# Patient Record
Sex: FEMALE | Race: WHITE | Employment: PART TIME | URBAN - METROPOLITAN AREA
[De-identification: names, ages, dates, MRNs, and addresses within clinical notes are randomized per-mention and may not be internally consistent; named-entity substitution may affect disease eponyms.]

---
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
## 2020-11-04 ENCOUNTER — HOSPITAL ENCOUNTER (OUTPATIENT)
Age: 20
Discharge: HOME | End: 2020-11-04

## 2020-11-04 LAB
A/G RATIO: 1.3 (ref 1.1–2.2)
ALBUMIN SERPL-MCNC: 4.3 G/DL (ref 3.4–5)
ALP BLD-CCNC: 92 U/L (ref 40–129)
ALT SERPL-CCNC: 12 U/L (ref 10–40)
ANION GAP SERPL CALCULATED.3IONS-SCNC: 14 MMOL/L (ref 3–16)
AST SERPL-CCNC: 17 U/L (ref 15–37)
BILIRUB SERPL-MCNC: 0.8 MG/DL (ref 0–1)
BUN BLDV-MCNC: 10 MG/DL (ref 7–20)
CALCIUM SERPL-MCNC: 9.1 MG/DL (ref 8.3–10.6)
CHLORIDE BLD-SCNC: 98 MMOL/L (ref 99–110)
CHOLESTEROL, TOTAL: 181 MG/DL (ref 0–199)
CO2: 24 MMOL/L (ref 21–32)
CREAT SERPL-MCNC: 0.7 MG/DL (ref 0.6–1.1)
GFR AFRICAN AMERICAN: >60
GFR NON-AFRICAN AMERICAN: >60
GLOBULIN: 3.3 G/DL
GLUCOSE BLD-MCNC: 73 MG/DL (ref 70–99)
HCT VFR BLD CALC: 40.3 % (ref 36–48)
HDLC SERPL-MCNC: 60 MG/DL (ref 40–60)
HEMOGLOBIN: 13.8 G/DL (ref 12–16)
LDL CHOLESTEROL CALCULATED: 109 MG/DL
MCH RBC QN AUTO: 27.6 PG (ref 26–34)
MCHC RBC AUTO-ENTMCNC: 34.1 G/DL (ref 31–36)
MCV RBC AUTO: 80.7 FL (ref 80–100)
PDW BLD-RTO: 13.6 % (ref 12.4–15.4)
PLATELET # BLD: 226 K/UL (ref 135–450)
PMV BLD AUTO: 8.1 FL (ref 5–10.5)
POTASSIUM SERPL-SCNC: 3.8 MMOL/L (ref 3.5–5.1)
PROLACTIN: 10.3 NG/ML
RBC # BLD: 4.99 M/UL (ref 4–5.2)
SODIUM BLD-SCNC: 136 MMOL/L (ref 136–145)
TOTAL PROTEIN: 7.6 G/DL (ref 6.4–8.2)
TRIGL SERPL-MCNC: 62 MG/DL (ref 0–150)
TSH SERPL DL<=0.05 MIU/L-ACNC: 0.91 UIU/ML (ref 0.27–4.2)
VLDLC SERPL CALC-MCNC: 12 MG/DL
WBC # BLD: 5.5 K/UL (ref 4–11)

## 2020-11-04 PROCEDURE — 85027 COMPLETE CBC AUTOMATED: CPT

## 2020-11-04 PROCEDURE — 80061 LIPID PANEL: CPT

## 2020-11-04 PROCEDURE — 36415 COLL VENOUS BLD VENIPUNCTURE: CPT

## 2020-11-04 PROCEDURE — 83835 ASSAY OF METANEPHRINES: CPT

## 2020-11-04 PROCEDURE — 80053 COMPREHEN METABOLIC PANEL: CPT

## 2020-11-04 PROCEDURE — 84146 ASSAY OF PROLACTIN: CPT

## 2020-11-04 PROCEDURE — 84443 ASSAY THYROID STIM HORMONE: CPT

## 2020-11-08 LAB
METANEPH/PLASMA INTERP: NORMAL
METANEPHRINE FREE PLASMA: 0.21 NMOL/L (ref 0–0.49)
NORMETANEPHRINE FREE PLASMA: 0.41 NMOL/L (ref 0–0.89)

## 2020-11-09 ENCOUNTER — HOSPITAL ENCOUNTER (OUTPATIENT)
Age: 20
Discharge: HOME | End: 2020-11-09

## 2020-11-09 PROCEDURE — 83835 ASSAY OF METANEPHRINES: CPT

## 2020-11-13 LAB
CREATININE 24 HOUR URINE: 1316 MG/D (ref 700–1600)
CREATININE URINE: 94 MG/DL
HOURS COLLECTED: 24
METANEPHRINE INTREP URINE: NORMAL
METANEPHRINE UG/G CRE: 55 UG/G CRT (ref 0–300)
METANEPHRINE, UR-PER VOL: 52 UG/L
METANEPHRINES URINE: 73 UG/D (ref 36–229)
NORMETANEPHRINE 24 HOUR URINE: 133 UG/D (ref 95–650)
NORMETANEPHRINE, (G/CRT): 101 UG/G CRT (ref 0–400)
NORMETANEPHRINES, NMOL/L: 95 UG/L
URINE TOTAL VOLUME: 1400

## 2022-05-02 ENCOUNTER — NURSE TRIAGE (OUTPATIENT)
Dept: OTHER | Facility: CLINIC | Age: 22
End: 2022-05-02

## 2022-05-02 NOTE — TELEPHONE ENCOUNTER
Limited triage due to adult not being present during call. Received call from Alicia Stanley at [facility] with Red Flag Complaint. Subjective: Caller states \"Dizziness\"     Current Symptoms: Dizzy episodes since Friday. States Pt is non-compliant with  BP meds. Onset: 4 days ago; intermittent    Associated Symptoms: NA    Pain Severity: Headaches    Temperature: denies fever    What has been tried: None    LMP: unsure Pregnant: No    Recommended disposition: See PCP within 3 Days    Care advice provided, patient verbalizes understanding; denies any other questions or concerns; instructed to call back for any new or worsening symptoms. Patient/Caller agrees with recommended disposition; writer provided warm transfer to [employer] at [facility] for appointment scheduling     Attention Provider: Thank you for allowing me to participate in the care of your patient. The patient was connected to triage in response to information provided to the ECC/PSC. Please do not respond through this encounter as the response is not directed to a shared pool.       Reason for Disposition   MILD dizziness (e.g., walking normally) AND has NOT been evaluated by physician for this (Exception: dizziness caused by heat exposure, sudden standing, or poor fluid intake)    Protocols used: DIZZINESS-ADULT-[place]

## 2024-11-27 PROBLEM — I10 UNCONTROLLED HYPERTENSION: Status: ACTIVE | Noted: 2024-11-27

## 2024-11-27 NOTE — PROGRESS NOTES
[facility]     CONSULTATION  [phone]  12/17/24  Referring: Colten Torrez DO (PCP)    REASON FOR CONSULT/CHIEF COMPLAINT/HPI     Reason for visit/ Chief complaint  Refractory Hypertension   HPI Blanca Castro is a 24 y.o. new female patient here by referral from Dr Cuevas for uncontrolled hypertension, diagnosed at age 14yo.     She has been worked up for this at a variety of locations including [facility], [facility], [facility].  She has had a multitude of tests and scans and no underlying etiology of secondary hypertension has been found.    Family history includes mother with HTN diagnosed in 20's and resolved after weight loss. Maternal grandfather cardiac arrest, ICD placement at 51yo. Paternal grandfather heart disease, Paternal grandmother TypeIDM.     COVID January 2022 August 22' she had an echo   September 22' she had a Cardiac MRI   January 23' she had a  Tilt Table test.   February 23' she wore a 24 hr BP monitor.     Today she is here with her mother. She works as a manager at [employer]. She is single, never been pregnant and not trying currently.  Denies smoking, drinking, or drug use. She drinks coffee daily.     She reports that she is not currently on any medications, \"its been a while\" (longer than a week). She stopped them due to feeling dizzy, nauseous, never helped lower her BP.     She denies snoring. Does have morning headaches. She had a sleep study about a month ago, has not gotten the results yet.     She has chest pain daily, tightness \"like something is sitting on her chest\". Denies currently. Her BP is high at home. She has headaches daily, blurry vision. Headache currently, BP is very high today, including on recheck by myself. Lowest BP at home is in 130's.     She was started on BP medications at [facility]. She tried to go back, but did not have health insurance and they wouldn't accept her. She will have new insurance the first of the

## 2024-12-17 ENCOUNTER — OFFICE VISIT (OUTPATIENT)
Dept: CARDIOLOGY CLINIC | Age: 24
End: 2024-12-17

## 2024-12-17 VITALS
SYSTOLIC BLOOD PRESSURE: 172 MMHG | BODY MASS INDEX: 32.65 KG/M2 | DIASTOLIC BLOOD PRESSURE: 102 MMHG | HEART RATE: 83 BPM | OXYGEN SATURATION: 99 % | WEIGHT: 196 LBS | HEIGHT: 65 IN

## 2024-12-17 DIAGNOSIS — R07.9 CHEST PAIN, UNSPECIFIED TYPE: ICD-10-CM

## 2024-12-17 DIAGNOSIS — R94.31 ABNORMAL EKG: ICD-10-CM

## 2024-12-17 DIAGNOSIS — R09.89 RENAL BRUIT: ICD-10-CM

## 2024-12-17 DIAGNOSIS — I77.3 FIBROMUSCULAR DYSPLASIA (HCC): ICD-10-CM

## 2024-12-17 DIAGNOSIS — R06.09 DOE (DYSPNEA ON EXERTION): ICD-10-CM

## 2024-12-17 DIAGNOSIS — E66.811 CLASS 1 OBESITY WITH SERIOUS COMORBIDITY AND BODY MASS INDEX (BMI) OF 32.0 TO 32.9 IN ADULT, UNSPECIFIED OBESITY TYPE: ICD-10-CM

## 2024-12-17 DIAGNOSIS — I10 UNCONTROLLED HYPERTENSION: Primary | ICD-10-CM

## 2024-12-17 DIAGNOSIS — I51.7 LVH (LEFT VENTRICULAR HYPERTROPHY): ICD-10-CM

## 2024-12-17 PROCEDURE — 99205 OFFICE O/P NEW HI 60 MIN: CPT | Performed by: INTERNAL MEDICINE

## 2024-12-17 PROCEDURE — 93000 ELECTROCARDIOGRAM COMPLETE: CPT | Performed by: INTERNAL MEDICINE

## 2024-12-17 PROCEDURE — 3080F DIAST BP >= 90 MM HG: CPT | Performed by: INTERNAL MEDICINE

## 2024-12-17 PROCEDURE — 3077F SYST BP >= 140 MM HG: CPT | Performed by: INTERNAL MEDICINE

## 2024-12-17 RX ORDER — CARVEDILOL 6.25 MG/1
6.25 TABLET ORAL 2 TIMES DAILY
Qty: 180 TABLET | Refills: 3 | Status: SHIPPED | OUTPATIENT
Start: 2024-12-17

## 2024-12-17 NOTE — PATIENT INSTRUCTIONS
Follow up with Dr Mercedes in 1 month     Echo soon     CTA soon     Labs soon - you can go upstairs and do these today if you would like. I will call you with results as soon as we get them.     Start: Carvedilol 6.25mg twice daily. Continue to monitor blood pressure and heart rate at home and keep a log.     Referral to nephrology      Call for any questions or concerns.

## 2024-12-18 PROBLEM — I77.3 FIBROMUSCULAR DYSPLASIA (HCC): Status: ACTIVE | Noted: 2024-12-18

## 2024-12-18 PROBLEM — I51.7 LVH (LEFT VENTRICULAR HYPERTROPHY): Status: ACTIVE | Noted: 2024-12-18

## 2024-12-18 PROBLEM — E66.811 CLASS 1 OBESITY WITH SERIOUS COMORBIDITY AND BODY MASS INDEX (BMI) OF 32.0 TO 32.9 IN ADULT: Status: ACTIVE | Noted: 2024-12-18

## 2024-12-18 NOTE — PROGRESS NOTES
well without side effects     HISTORY/ALLERGIES/ROS     MedHx:  has a past medical history of Hypertension.  SurgHx:  has no past surgical history on file.   SocHx:  reports that she has never smoked. She has never used smokeless tobacco. She reports that she does not currently use alcohol. She reports that she does not use drugs.   FamHx:   Family History   Problem Relation Age of Onset    Heart Disease Maternal Grandfather     Diabetes Paternal Grandmother     Hypertension Paternal Grandfather      Allergies: Patient has no known allergies.     MEDICATIONS      Prior to Admission medications    Medication Sig Start Date End Date Taking? Authorizing Provider   Cholecalciferol (VITAMIN D-3 PO) Take by mouth   Yes Emerald Alcala MD   MAGNESIUM PO Take by mouth   Yes Emerald Alcala MD   carvedilol (COREG) 25 MG tablet Take 1 tablet by mouth 2 times daily 1/14/25  Yes Isma Mercedes MD   spironolactone (ALDACTONE) 50 MG tablet Take 1 tablet by mouth daily 1/14/25  Yes Isma Mercedes MD     PHYSICAL EXAM        Vitals:    01/14/25 1444   BP: (!) 200/108   Pulse:    SpO2:       Weight - Scale: 85.7 kg (189 lb)     Gen Alert, cooperative, no distress Heart  Regular rate and rhythm, no murmur.  Normal S1 and S2.  +S4 gallop.  Warm/well perfused.  No JVD.    HEENT Normocephalic, atraumatic.  Conj/corn clear  Lips, mucosa, tongue normal Abd  Soft, nontender, nondistended, no organomegaly  +Left abdominal bruit   Neck Supple, trachea midline Ext  Ext nl, AT, no C/C, no edema   Lungs Lungs clear to auscultation bilaterally, unlabored breathing Pulse 2+ and symmetric   Chest wall No deformity Skin Color/text/turg nl, no rash/lesions   Neuro No gross deficits Psych Nl mood and affect     LABS and Imaging     Relevant and available CV data reviewed    CARDIAC IMAGING/TESTING:  EKG personally interpreted: 12/17/24   Sinus Rhythm   -Horizontal axis for age.  Voltage criteria for LVH (R(I)+S(III) exceeds 2.50

## 2025-01-06 ENCOUNTER — HOSPITAL ENCOUNTER (OUTPATIENT)
Age: 25
Discharge: HOME | End: 2025-01-06
Payer: COMMERCIAL

## 2025-01-06 DIAGNOSIS — R06.09 DOE (DYSPNEA ON EXERTION): ICD-10-CM

## 2025-01-06 DIAGNOSIS — I10 UNCONTROLLED HYPERTENSION: ICD-10-CM

## 2025-01-06 DIAGNOSIS — R09.89 RENAL BRUIT: ICD-10-CM

## 2025-01-06 DIAGNOSIS — R07.9 CHEST PAIN, UNSPECIFIED TYPE: ICD-10-CM

## 2025-01-06 PROCEDURE — 84443 ASSAY THYROID STIM HORMONE: CPT

## 2025-01-06 PROCEDURE — 84244 ASSAY OF RENIN: CPT

## 2025-01-06 PROCEDURE — 85027 COMPLETE CBC AUTOMATED: CPT

## 2025-01-06 PROCEDURE — 83880 ASSAY OF NATRIURETIC PEPTIDE: CPT

## 2025-01-06 PROCEDURE — 36415 COLL VENOUS BLD VENIPUNCTURE: CPT

## 2025-01-06 PROCEDURE — 82570 ASSAY OF URINE CREATININE: CPT

## 2025-01-06 PROCEDURE — 84156 ASSAY OF PROTEIN URINE: CPT

## 2025-01-06 PROCEDURE — 82088 ASSAY OF ALDOSTERONE: CPT

## 2025-01-06 PROCEDURE — 80053 COMPREHEN METABOLIC PANEL: CPT

## 2025-01-07 ENCOUNTER — HOSPITAL ENCOUNTER (OUTPATIENT)
Dept: CT IMAGING | Age: 25
Discharge: HOME | End: 2025-01-07
Attending: INTERNAL MEDICINE
Payer: COMMERCIAL

## 2025-01-07 ENCOUNTER — TELEPHONE (OUTPATIENT)
Dept: CARDIOLOGY CLINIC | Age: 25
End: 2025-01-07

## 2025-01-07 DIAGNOSIS — R09.89 RENAL BRUIT: ICD-10-CM

## 2025-01-07 DIAGNOSIS — I10 UNCONTROLLED HYPERTENSION: ICD-10-CM

## 2025-01-07 LAB
ALBUMIN SERPL-MCNC: 4.5 G/DL (ref 3.4–5)
ALBUMIN/GLOB SERPL: 1.6 {RATIO} (ref 1.1–2.2)
ALP SERPL-CCNC: 102 U/L (ref 40–129)
ALT SERPL-CCNC: 19 U/L (ref 10–40)
ANION GAP SERPL CALCULATED.3IONS-SCNC: 11 MMOL/L (ref 3–16)
AST SERPL-CCNC: 20 U/L (ref 15–37)
BILIRUB SERPL-MCNC: 0.4 MG/DL (ref 0–1)
BUN SERPL-MCNC: 8 MG/DL (ref 7–20)
CALCIUM SERPL-MCNC: 9.2 MG/DL (ref 8.3–10.6)
CHLORIDE SERPL-SCNC: 98 MMOL/L (ref 99–110)
CO2 SERPL-SCNC: 28 MMOL/L (ref 21–32)
CREAT SERPL-MCNC: 0.8 MG/DL (ref 0.6–1.1)
CREAT UR-MCNC: 92.1 MG/DL (ref 28–259)
DEPRECATED RDW RBC AUTO: 13.6 % (ref 12.4–15.4)
GFR SERPLBLD CREATININE-BSD FMLA CKD-EPI: >90 ML/MIN/{1.73_M2}
GLUCOSE SERPL-MCNC: 91 MG/DL (ref 70–99)
HCT VFR BLD AUTO: 41.8 % (ref 36–48)
HGB BLD-MCNC: 13.9 G/DL (ref 12–16)
MCH RBC QN AUTO: 28.4 PG (ref 26–34)
MCHC RBC AUTO-ENTMCNC: 33.3 G/DL (ref 31–36)
MCV RBC AUTO: 85.1 FL (ref 80–100)
NT-PROBNP SERPL-MCNC: 143 PG/ML (ref 0–124)
PLATELET # BLD AUTO: 239 K/UL (ref 135–450)
PMV BLD AUTO: 8.5 FL (ref 5–10.5)
POTASSIUM SERPL-SCNC: 3.7 MMOL/L (ref 3.5–5.1)
PROT SERPL-MCNC: 7.3 G/DL (ref 6.4–8.2)
PROT UR-MCNC: 9.92 MG/DL
PROT/CREAT UR-RTO: 0.1 MG/DL
RBC # BLD AUTO: 4.9 M/UL (ref 4–5.2)
SODIUM SERPL-SCNC: 137 MMOL/L (ref 136–145)
TSH SERPL DL<=0.005 MIU/L-ACNC: 1.34 UIU/ML (ref 0.27–4.2)
WBC # BLD AUTO: 4.7 K/UL (ref 4–11)

## 2025-01-07 PROCEDURE — 6360000004 HC RX CONTRAST MEDICATION: Performed by: INTERNAL MEDICINE

## 2025-01-07 PROCEDURE — 74174 CTA ABD&PLVS W/CONTRAST: CPT

## 2025-01-07 RX ADMIN — IOHEXOL 75 ML: 350 INJECTION, SOLUTION INTRAVENOUS at 08:29

## 2025-01-07 NOTE — TELEPHONE ENCOUNTER
----- Message from Dr. Isma Mercedes MD sent at 1/7/2025  1:56 PM EST -----  CTA normal, no evidence of renal artery stenosis

## 2025-01-08 ENCOUNTER — TELEPHONE (OUTPATIENT)
Dept: CARDIOLOGY CLINIC | Age: 25
End: 2025-01-08

## 2025-01-08 NOTE — TELEPHONE ENCOUNTER
----- Message from Dr. Isma Mercedes MD sent at 1/7/2025  5:07 PM EST -----  BNP mildly elevated, this is likely due to her high blood pressure and not actual heart failure

## 2025-01-08 NOTE — TELEPHONE ENCOUNTER
Attempted to reach patient per [employer] message. NA, LVM.    IF PT RETURNS CALL, PLEASE ADVISE PER [employer] MESSAGE. THANK YOU!

## 2025-01-09 ENCOUNTER — TELEPHONE (OUTPATIENT)
Dept: CARDIOLOGY CLINIC | Age: 25
End: 2025-01-09

## 2025-01-09 LAB
ALDOST SERPL-MCNC: 10.5 NG/DL
ALDOST/RENIN PLAS-RTO: 4.4 RATIO
RENIN PLAS-CCNC: 2.4 NG/ML/HR

## 2025-01-09 NOTE — TELEPHONE ENCOUNTER
----- Message from Dr. Isma Mercedes MD sent at 1/9/2025  1:24 PM EST -----  Renin/aldosterone normal range

## 2025-01-09 NOTE — TELEPHONE ENCOUNTER
Please call pt to advise per [facility] message. If able to reach, please advise of other lab results as well. Thank you!

## 2025-01-14 ENCOUNTER — TELEPHONE (OUTPATIENT)
Dept: CARDIOLOGY CLINIC | Age: 25
End: 2025-01-14

## 2025-01-14 ENCOUNTER — OFFICE VISIT (OUTPATIENT)
Dept: CARDIOLOGY CLINIC | Age: 25
End: 2025-01-14
Payer: COMMERCIAL

## 2025-01-14 VITALS
OXYGEN SATURATION: 100 % | DIASTOLIC BLOOD PRESSURE: 108 MMHG | SYSTOLIC BLOOD PRESSURE: 200 MMHG | HEIGHT: 65 IN | HEART RATE: 75 BPM | BODY MASS INDEX: 31.49 KG/M2 | WEIGHT: 189 LBS

## 2025-01-14 DIAGNOSIS — I10 UNCONTROLLED HYPERTENSION: Primary | ICD-10-CM

## 2025-01-14 DIAGNOSIS — R07.9 CHEST PAIN, UNSPECIFIED TYPE: ICD-10-CM

## 2025-01-14 DIAGNOSIS — E66.811 CLASS 1 OBESITY WITH SERIOUS COMORBIDITY AND BODY MASS INDEX (BMI) OF 32.0 TO 32.9 IN ADULT, UNSPECIFIED OBESITY TYPE: ICD-10-CM

## 2025-01-14 DIAGNOSIS — R09.89 RENAL BRUIT: ICD-10-CM

## 2025-01-14 DIAGNOSIS — I77.3 FIBROMUSCULAR DYSPLASIA (HCC): ICD-10-CM

## 2025-01-14 DIAGNOSIS — I51.7 LVH (LEFT VENTRICULAR HYPERTROPHY): ICD-10-CM

## 2025-01-14 DIAGNOSIS — R06.09 DOE (DYSPNEA ON EXERTION): ICD-10-CM

## 2025-01-14 PROCEDURE — 3080F DIAST BP >= 90 MM HG: CPT | Performed by: INTERNAL MEDICINE

## 2025-01-14 PROCEDURE — 3077F SYST BP >= 140 MM HG: CPT | Performed by: INTERNAL MEDICINE

## 2025-01-14 PROCEDURE — 99214 OFFICE O/P EST MOD 30 MIN: CPT | Performed by: INTERNAL MEDICINE

## 2025-01-14 RX ORDER — SPIRONOLACTONE 50 MG/1
50 TABLET, FILM COATED ORAL DAILY
Qty: 90 TABLET | Refills: 3 | Status: SHIPPED | OUTPATIENT
Start: 2025-01-14

## 2025-01-14 RX ORDER — CARVEDILOL 25 MG/1
25 TABLET ORAL 2 TIMES DAILY
Qty: 180 TABLET | Refills: 1 | Status: SHIPPED | OUTPATIENT
Start: 2025-01-14

## 2025-01-14 NOTE — TELEPHONE ENCOUNTER
Attempted to call pt. N/A no VM.    Will let pt know once letter is received and we can fax order to the approved sites.

## 2025-01-14 NOTE — TELEPHONE ENCOUNTER
Esther Lin is calling on behalf of [facility] in regards to the Chest/Pelvic CTAs. She states that the tests have been approved. However, pt is under the [facility] [facility] program and [facility] is not on the list of approved sites. They are sending over a fax with a list of criteria that could make [facility] approved. Only one criteria needs to be filled for approval. The fax will give everything in detail.    For expedited service a call can be placed instead of waiting for fax.  Phone # [phone].  Reference case # [id number].

## 2025-01-14 NOTE — PATIENT INSTRUCTIONS
Increase carvedilol to 25 mg twice a day  I have sent in a new Rx for spironolactone 50 mg daily    I referred you to Dr. Partida (kidney doctor) and personally called him.  He said his office will call you and get you in sometime this week.  If you do not hear from them by tomorrow evening, please call us so we can make sure you get seen.    Your echo shows a leaky aortic valve - but this is likely just because of your severe high  blood pressure, and should get better with treatment of your blood pressure.    Follow-up with Dr Mercedes in 1 month.  Once nephrology establishes care with you you will be seeing mostly them rather than cardiology for your abnormal blood pressure.

## 2025-02-11 ENCOUNTER — HOSPITAL ENCOUNTER (OUTPATIENT)
Age: 25
Discharge: HOME | End: 2025-02-11
Payer: COMMERCIAL

## 2025-02-11 DIAGNOSIS — I12.9 HYPERTENSIVE NEPHROPATHY: ICD-10-CM

## 2025-02-11 LAB
ALBUMIN SERPL-MCNC: 4.2 G/DL (ref 3.4–5)
ANION GAP SERPL CALCULATED.3IONS-SCNC: 6 MMOL/L (ref 3–16)
BACTERIA URNS QL MICRO: ABNORMAL /HPF
BILIRUB UR QL STRIP.AUTO: NEGATIVE
BUN SERPL-MCNC: 9 MG/DL (ref 7–20)
CALCIUM SERPL-MCNC: 9.2 MG/DL (ref 8.3–10.6)
CHLORIDE SERPL-SCNC: 102 MMOL/L (ref 99–110)
CLARITY UR: CLEAR
CO2 SERPL-SCNC: 31 MMOL/L (ref 21–32)
COLOR UR: YELLOW
CORTIS AM PEAK SERPL-MCNC: 8.5 UG/DL (ref 4.3–22.4)
CREAT SERPL-MCNC: 0.8 MG/DL (ref 0.6–1.1)
CREAT UR-MCNC: 259 MG/DL (ref 28–259)
EPI CELLS #/AREA URNS AUTO: 3 /HPF (ref 0–5)
GFR SERPLBLD CREATININE-BSD FMLA CKD-EPI: >90 ML/MIN/{1.73_M2}
GLUCOSE SERPL-MCNC: 72 MG/DL (ref 70–99)
GLUCOSE UR STRIP.AUTO-MCNC: NEGATIVE MG/DL
HGB UR QL STRIP.AUTO: NEGATIVE
HYALINE CASTS #/AREA URNS AUTO: 1 /LPF (ref 0–8)
KETONES UR STRIP.AUTO-MCNC: NEGATIVE MG/DL
LEUKOCYTE ESTERASE UR QL STRIP.AUTO: NEGATIVE
MICROALBUMIN UR DL<=1MG/L-MCNC: 1.61 MG/DL
MICROALBUMIN/CREAT UR: 6.2 MG/G (ref 0–30)
NITRITE UR QL STRIP.AUTO: NEGATIVE
PH UR STRIP.AUTO: 6 [PH] (ref 5–8)
PHOSPHATE SERPL-MCNC: 3.1 MG/DL (ref 2.5–4.9)
POTASSIUM SERPL-SCNC: 3.9 MMOL/L (ref 3.5–5.1)
PROT UR STRIP.AUTO-MCNC: ABNORMAL MG/DL
RBC CLUMPS #/AREA URNS AUTO: 3 /HPF (ref 0–4)
SODIUM SERPL-SCNC: 139 MMOL/L (ref 136–145)
SP GR UR STRIP.AUTO: 1.02 (ref 1–1.03)
UA DIPSTICK W REFLEX MICRO PNL UR: YES
URN SPEC COLLECT METH UR: ABNORMAL
UROBILINOGEN UR STRIP-ACNC: 0.2 E.U./DL
WBC #/AREA URNS AUTO: 2 /HPF (ref 0–5)

## 2025-02-11 PROCEDURE — 82570 ASSAY OF URINE CREATININE: CPT

## 2025-02-11 PROCEDURE — 36415 COLL VENOUS BLD VENIPUNCTURE: CPT

## 2025-02-11 PROCEDURE — 82043 UR ALBUMIN QUANTITATIVE: CPT

## 2025-02-11 PROCEDURE — 84244 ASSAY OF RENIN: CPT

## 2025-02-11 PROCEDURE — 80069 RENAL FUNCTION PANEL: CPT

## 2025-02-11 PROCEDURE — 82533 TOTAL CORTISOL: CPT

## 2025-02-11 PROCEDURE — 83835 ASSAY OF METANEPHRINES: CPT

## 2025-02-11 PROCEDURE — 81001 URINALYSIS AUTO W/SCOPE: CPT

## 2025-02-11 PROCEDURE — 82088 ASSAY OF ALDOSTERONE: CPT

## 2025-02-13 LAB
ALDOST SERPL-MCNC: 11.2 NG/DL
RENIN PLAS-CCNC: 2.8 NG/ML/HR

## 2025-02-14 LAB
ANNOTATION COMMENT IMP: NORMAL
METANEPHS SERPL-SCNC: 0.12 NMOL/L (ref 0–0.49)
NORMETANEPHRINE SERPL-SCNC: 0.38 NMOL/L (ref 0–0.89)